# Patient Record
Sex: FEMALE | Race: AMERICAN INDIAN OR ALASKA NATIVE
[De-identification: names, ages, dates, MRNs, and addresses within clinical notes are randomized per-mention and may not be internally consistent; named-entity substitution may affect disease eponyms.]

---

## 2021-11-29 ENCOUNTER — HOSPITAL ENCOUNTER (OUTPATIENT)
Dept: HOSPITAL 5 - FLUORO | Age: 41
Discharge: HOME | End: 2021-11-29
Attending: SURGERY
Payer: MEDICAID

## 2021-11-29 DIAGNOSIS — E66.01: Primary | ICD-10-CM

## 2021-11-29 PROCEDURE — 71046 X-RAY EXAM CHEST 2 VIEWS: CPT

## 2021-11-29 PROCEDURE — 74220 X-RAY XM ESOPHAGUS 1CNTRST: CPT

## 2021-11-29 NOTE — FLUOROSCOPY REPORT
BARIUM SWALLOW



Indication:  MORBID OBESITY.



Technique:  Single and double  contrast barium technique utilized to evaluate the esophagus.  



FINDINGS:  To begin the exam, swallowing was evaluated in the lateral position under direct fluorosco
py.  Swallowing was normal.



No mucosal irregularity, mass, mass effect, or critical stenosis.   There were no abnormal tertiary c
ontractions as seen with dysmotility. No gastroesophageal reflux.



IMPRESSION:  Unremarkable exam.



Fluoroscopic time: 1.0 minutes



Number of fluoroscopic images:  26



Signer Name: Aubrey Bee Jr, MD 

Signed: 11/29/2021 10:31 AM

Workstation Name: [id number]

## 2021-11-29 NOTE — XRAY REPORT
CHEST 2 VIEWS 



INDICATION / CLINICAL INFORMATION:

MORBID OBESITY.



COMPARISON: 

None available.



FINDINGS:



SUPPORT DEVICES: None.



HEART / MEDIASTINUM: No significant abnormality. 



LUNGS / PLEURA: No significant pulmonary or pleural abnormality. No pneumothorax. 



ADDITIONAL FINDINGS: No significant additional findings.



IMPRESSION:

1. No acute findings.



Signer Name: Andres Cannon MD 

Signed: 11/29/2021 10:22 AM

Workstation Name: [id number]

## 2021-12-09 ENCOUNTER — HOSPITAL ENCOUNTER (OUTPATIENT)
Dept: HOSPITAL 5 - SLR | Age: 41
LOS: 1 days | Discharge: HOME | End: 2021-12-10
Attending: SURGERY
Payer: MEDICAID

## 2021-12-09 DIAGNOSIS — E66.1: ICD-10-CM

## 2021-12-09 DIAGNOSIS — R40.0: ICD-10-CM

## 2021-12-09 DIAGNOSIS — G47.33: Primary | ICD-10-CM

## 2021-12-09 PROCEDURE — G0399 HOME SLEEP TEST/TYPE 3 PORTA: HCPCS

## 2021-12-21 ENCOUNTER — HOSPITAL ENCOUNTER (OUTPATIENT)
Dept: HOSPITAL 5 - GIO | Age: 41
Discharge: HOME | End: 2021-12-21
Attending: SURGERY
Payer: MEDICAID

## 2021-12-21 VITALS — SYSTOLIC BLOOD PRESSURE: 117 MMHG | DIASTOLIC BLOOD PRESSURE: 75 MMHG

## 2021-12-21 DIAGNOSIS — K31.89: ICD-10-CM

## 2021-12-21 DIAGNOSIS — K29.50: ICD-10-CM

## 2021-12-21 DIAGNOSIS — K44.9: ICD-10-CM

## 2021-12-21 DIAGNOSIS — G47.30: ICD-10-CM

## 2021-12-21 DIAGNOSIS — I10: ICD-10-CM

## 2021-12-21 DIAGNOSIS — Z79.899: ICD-10-CM

## 2021-12-21 DIAGNOSIS — E66.01: Primary | ICD-10-CM

## 2021-12-21 DIAGNOSIS — Z98.890: ICD-10-CM

## 2021-12-21 DIAGNOSIS — K21.9: ICD-10-CM

## 2021-12-21 PROCEDURE — 88305 TISSUE EXAM BY PATHOLOGIST: CPT

## 2021-12-21 PROCEDURE — 43239 EGD BIOPSY SINGLE/MULTIPLE: CPT

## 2021-12-21 PROCEDURE — 88342 IMHCHEM/IMCYTCHM 1ST ANTB: CPT

## 2021-12-21 NOTE — DISCHARGE SUMMARY
Providers





- Providers


Date of Admission: 


12/21/2021


Date of discharge: 12/21/21


Attending physician: 


JUDITH MERCEDES MD





Primary care physician: 


EDUARDO JOY








Hospitalization


Reason for admission: pre-op egd


Condition: Good


Procedures: 





egd with bx


Hospital course: 





Pt presented for a pre-op EGD as part of planning for up coming bariatric 

surgery. Procedure was uneventful and pt recovered well and was discharged to 

home.


Disposition: 01 HOME / SELF CARE / HOMELESS


Final Discharge Diagnosis (Prints w/discharge instructions): dyspepsia, morbid 

obesity





Core Measure Documentation





- Palliative Care


Palliative Care/ Comfort Measures: Not Applicable





- Core Measures


Any of the following diagnoses?: none





Exam





- Physical Exam


Narrative exam: 





unchanged from pre-op





Plan


Activity: no restrictions


Diet: low carbohydrate


Follow up with: 


EDUARDO JOY MD [Primary Care Provider] - 7 Days

## 2021-12-21 NOTE — ANESTHESIA CONSULTATION
Anesthesia Consult and Med Hx





- Airway


Anesthetic Teeth Evaluation: Good


ROM Head & Neck: Adequate


Mental/Hyoid Distance: Adequate


Mallampati Class: Class II


Intubation Access Assessment: Good





- Pre-Operative Health Status


ASA Pre-Surgery Classification: ASA3


Proposed Anesthetic Plan: MAC





- Pulmonary


Hx Smoking: No


Hx Sleep Apnea: Yes





- Cardiovascular System


Hx Hypertension: Yes





- Gastrointestinal


Hx Gastroesophageal Reflux Disease: Yes (Well controlled)





- Endocrine


Hx Non-Insulin Dependent Diabetes: No





- Hematic


Hx Sickle Cell Disease: No





- Other Systems


Hx Obesity: Yes

## 2021-12-21 NOTE — OPERATIVE REPORT
Operative Report


Operative Report: 





DATE: 12/21/2021


 


SURGERY:  Upper endoscopy.


 


SURGEON:  Holly Buchanan M.D.





PROCEDURE: EGD with biopsy


 


 PRE OP DX: morbid obesity, GERD


 


POST OP DX: morbid obesity, GERD


 


TYPE OF ANESTHESIA:  MAC.


 


ESTIMATED BLOOD LOSS:  None.


 


COMPLICATIONS:  None.


 


SPECIMENS REMOVED: antral biopsy


 


FINDINGS:


1.  Small hiatal hernia.


2.  gastritis





INDICATIONS:INDICATION FOR PROCEDURE:  Patient is a 41-year-old female with a 

long history of morbid obesity.  She is planned to have a weight loss procedure 

and is here for preoperative planning EGD.


 


PROCEDURE DETAILS: After consent was reviewed, patient was taken back to


the operating room where patient was placed in the left lateral decubitus 


position and a bite block was placed in the mouth.  After a time-out was


called, MAC anesthesia was initiated.  I then passed the endoscope into her


oropharynx, into her esophagus, visualized the entire esophagus, which was all


within normal limits. Z-line was noted to about 38cm from incisors.  I then 

visualized the stomach and the first portion of


the duodenum and there were no abnormalities I could clearly visualize except 

for antral gastritis. A cold forceps biopsy of the antrum was taken and will be 

sent to pathology to evaluate for H.pylori.  I


then retroflexed the scope in the stomach and visualized the hiatus and I


could see a small hiatal hernia.  I then desufflated the stomach and


removed the endoscope.  Patient tolerated procedure well and was transferred


to recovery room in good and stable condition.

## 2022-01-04 ENCOUNTER — HOSPITAL ENCOUNTER (OUTPATIENT)
Dept: HOSPITAL 5 - SLR | Age: 42
LOS: 1 days | Discharge: HOME | End: 2022-01-05
Attending: INTERNAL MEDICINE
Payer: MEDICAID

## 2022-01-04 DIAGNOSIS — G47.33: Primary | ICD-10-CM

## 2022-01-04 PROCEDURE — 95811 POLYSOM 6/>YRS CPAP 4/> PARM: CPT
